# Patient Record
Sex: FEMALE | Race: WHITE | NOT HISPANIC OR LATINO | Employment: UNEMPLOYED | URBAN - METROPOLITAN AREA
[De-identification: names, ages, dates, MRNs, and addresses within clinical notes are randomized per-mention and may not be internally consistent; named-entity substitution may affect disease eponyms.]

---
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
## 2019-03-10 PROBLEM — O60.03 PRETERM LABOR IN THIRD TRIMESTER: Status: ACTIVE | Noted: 2019-03-10

## 2019-03-31 PROBLEM — O47.9 UTERINE CONTRACTIONS DURING PREGNANCY: Status: ACTIVE | Noted: 2019-03-31

## 2019-05-06 PROBLEM — Z34.90 PREGNANCY: Status: ACTIVE | Noted: 2019-05-06

## 2019-05-07 PROBLEM — Z37.9 NORMAL LABOR: Status: ACTIVE | Noted: 2019-05-07

## 2019-06-17 PROBLEM — O60.03 PRETERM LABOR IN THIRD TRIMESTER: Status: RESOLVED | Noted: 2019-03-10 | Resolved: 2019-06-17

## 2019-08-12 PROBLEM — Z37.9 NORMAL LABOR: Status: RESOLVED | Noted: 2019-05-07 | Resolved: 2019-08-12

## 2020-02-01 PROBLEM — O46.90 VAGINAL BLEEDING DURING PREGNANCY: Status: ACTIVE | Noted: 2020-02-01

## 2020-02-27 PROBLEM — R10.9 ABDOMINAL PAIN AFFECTING PREGNANCY: Status: ACTIVE | Noted: 2020-02-27

## 2020-02-27 PROBLEM — O26.899 ABDOMINAL PAIN AFFECTING PREGNANCY: Status: ACTIVE | Noted: 2020-02-27
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
## 2020-03-25 PROBLEM — N85.8 ALTERATION IN COMFORT ASSOCIATED WITH UTERINE CONTRACTIONS: Status: ACTIVE | Noted: 2020-03-25

## 2020-03-26 PROBLEM — N39.0 ACUTE LOWER URINARY TRACT INFECTION: Status: RESOLVED | Noted: 2018-04-08 | Resolved: 2020-03-26

## 2020-03-26 PROBLEM — R10.9 ABDOMINAL PAIN AFFECTING PREGNANCY: Status: RESOLVED | Noted: 2020-02-27 | Resolved: 2020-03-26

## 2020-03-26 PROBLEM — O46.90 VAGINAL BLEEDING DURING PREGNANCY: Status: RESOLVED | Noted: 2020-02-01 | Resolved: 2020-03-26
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
## 2020-03-26 PROBLEM — R21 RASH OF GENITAL AREA: Status: RESOLVED | Noted: 2017-09-29 | Resolved: 2020-03-26

## 2020-03-26 PROBLEM — O26.899 ABDOMINAL PAIN AFFECTING PREGNANCY: Status: RESOLVED | Noted: 2020-02-27 | Resolved: 2020-03-26

## 2020-03-26 PROBLEM — O47.9 UTERINE CONTRACTIONS DURING PREGNANCY: Status: RESOLVED | Noted: 2019-03-31 | Resolved: 2020-03-26

## 2020-03-26 PROBLEM — R11.2 NON-INTRACTABLE VOMITING WITH NAUSEA: Status: RESOLVED | Noted: 2018-04-08 | Resolved: 2020-03-26

## 2021-04-01 ENCOUNTER — NURSE TRIAGE (OUTPATIENT)
Dept: ADMINISTRATIVE | Facility: CLINIC | Age: 21
End: 2021-04-01

## 2021-05-04 ENCOUNTER — PATIENT MESSAGE (OUTPATIENT)
Dept: RESEARCH | Facility: HOSPITAL | Age: 21
End: 2021-05-04

## 2021-07-28 PROBLEM — Z20.822 CLOSE EXPOSURE TO COVID-19 VIRUS: Status: ACTIVE | Noted: 2021-07-28

## 2023-01-23 PROBLEM — K81.9 CHOLECYSTITIS: Status: ACTIVE | Noted: 2023-01-23

## 2023-11-16 PROBLEM — N92.0 MENORRHAGIA: Status: ACTIVE | Noted: 2023-11-16

## 2024-08-20 LAB — CHLAMYDIA BY NAA: NEGATIVE

## 2024-12-13 ENCOUNTER — PATIENT OUTREACH (OUTPATIENT)
Dept: ADMINISTRATIVE | Facility: HOSPITAL | Age: 24
End: 2024-12-13
Payer: MEDICAID